# Patient Record
Sex: FEMALE
[De-identification: names, ages, dates, MRNs, and addresses within clinical notes are randomized per-mention and may not be internally consistent; named-entity substitution may affect disease eponyms.]

---

## 2017-09-19 ENCOUNTER — HOSPITAL ENCOUNTER (OUTPATIENT)
Dept: HOSPITAL 92 - LABBT | Age: 47
Discharge: HOME | End: 2017-09-19
Attending: SURGERY
Payer: COMMERCIAL

## 2017-09-19 DIAGNOSIS — D05.12: ICD-10-CM

## 2017-09-19 DIAGNOSIS — Z01.812: Primary | ICD-10-CM

## 2017-09-19 LAB
ANION GAP SERPL CALC-SCNC: 16 MMOL/L (ref 10–20)
BUN SERPL-MCNC: 13 MG/DL (ref 7–18.7)
CALCIUM SERPL-MCNC: 9.9 MG/DL (ref 7.8–10.44)
CHLORIDE SERPL-SCNC: 106 MMOL/L (ref 98–107)
CO2 SERPL-SCNC: 23 MMOL/L (ref 22–29)
CREAT CL PREDICTED SERPL C-G-VRATE: 0 ML/MIN (ref 70–130)
HCT VFR BLD CALC: 46 % (ref 36–47)
RBC # BLD AUTO: 4.86 MILL/UL (ref 4.2–5.4)
WBC # BLD AUTO: 6.6 THOU/UL (ref 4.8–10.8)

## 2017-09-19 PROCEDURE — 85025 COMPLETE CBC W/AUTO DIFF WBC: CPT

## 2017-09-19 PROCEDURE — 80048 BASIC METABOLIC PNL TOTAL CA: CPT

## 2017-09-28 ENCOUNTER — HOSPITAL ENCOUNTER (OUTPATIENT)
Dept: HOSPITAL 92 - SDC | Age: 47
Discharge: HOME | End: 2017-09-28
Attending: SURGERY
Payer: COMMERCIAL

## 2017-09-28 VITALS — BODY MASS INDEX: 32.9 KG/M2

## 2017-09-28 DIAGNOSIS — Z98.51: ICD-10-CM

## 2017-09-28 DIAGNOSIS — D05.12: Primary | ICD-10-CM

## 2017-09-28 PROCEDURE — 88333 PATH CONSLTJ SURG CYTO XM 1: CPT

## 2017-09-28 PROCEDURE — 76098 X-RAY EXAM SURGICAL SPECIMEN: CPT

## 2017-09-28 PROCEDURE — 88342 IMHCHEM/IMCYTCHM 1ST ANTB: CPT

## 2017-09-28 PROCEDURE — 0HBU0ZZ EXCISION OF LEFT BREAST, OPEN APPROACH: ICD-10-PCS | Performed by: SURGERY

## 2017-09-28 PROCEDURE — C71M1ZZ PLANAR NUCLEAR MEDICINE IMAGING OF TRUNK LYMPHATICS USING TECHNETIUM 99M (TC-99M): ICD-10-PCS | Performed by: RADIOLOGY

## 2017-09-28 PROCEDURE — A9541 TC99M SULFUR COLLOID: HCPCS

## 2017-09-28 PROCEDURE — 19281 PERQ DEVICE BREAST 1ST IMAG: CPT

## 2017-09-28 PROCEDURE — 78195 LYMPH SYSTEM IMAGING: CPT

## 2017-09-28 PROCEDURE — 88307 TISSUE EXAM BY PATHOLOGIST: CPT

## 2017-09-28 PROCEDURE — 88341 IMHCHEM/IMCYTCHM EA ADD ANTB: CPT

## 2017-09-28 PROCEDURE — BH01ZZZ PLAIN RADIOGRAPHY OF LEFT BREAST: ICD-10-PCS

## 2017-09-28 NOTE — MMO
SURGICAL SPECIMEN:

 

Date:  09/28/17 

 

HISTORY:  

Status post right breast biopsy. DCIS. Patient scheduled for surgical excision. 

 

FINDINGS/IMPRESSION:

A single view of the specimen demonstrates the surgical clip and calcifications. 

 

The results were conveyed by Muriel to the OR. 

 

 

 

POS: BUBBA

## 2017-09-28 NOTE — PDOC.OP
Operative Note





- Operative Note


Operative Note: 





PROCEDURE: Left breast needle localized lumpectomy and sentinel lymph node 

biopsy





DATE OF PROCEDURE: 9/28/2017





SURGEON: Concha Barrera M.D.





PREOPERATIVE DIAGNOSES: High-grade left breast DCIS with comedonecrosis





POSTOPERATIVE DIAGNOSIS: High-grade left breast DCIS with comedonecrosis





HISTORY: Patient is 47-year-old woman with recently diagnosed DCIS of the left 

breast. She has decided to undergo breast conservation therapy. [place] lymph 

node biopsy was recommended based on the pathological features of the DCIS, 

which was high-grade with necrosis noted, with increased risk of occult 

invasive disease.





PROCEDURE IN DETAIL: The patient underwent lymphoscintigraphy and needle 

localization of the left breast mass in radiology prior to being taken to the 

operating room. She was then taken to the operating room and placed in supine 

position and general anesthesia by LMA was administered. Appropriate 

preoperative antibiotics were administered and she has been fissure and 

injected behind the left areola and the breast was massaged for several minutes 

taking care not to displace or disturb the needle localization wire. She was 

then prepped and draped in the standard sterile fashion and local anesthesia 

infused the skin and subcutaneous tissues of the axilla. An incision was made 

over the lower edge of the hairbearing skin of the axilla and dissection 

carried down to the area of highest activity. Some blue lymphatics were seen 

feeding a small lymph node in this area which is morphologically normal. The 

lymphatics were clipped and the lymph node resected. A target count of 114 was 

obtained and the specimen was sent as sentinel lymph node #1. An additional 

area of increased activity was identified more laterally and a another small 

morphologically normal lymph node identified. This was not blue and had a 

target count of 77 and was sent as sentinel lymph node #2. Some additional 

axillary tissue was incidentally resected while dissecting out the second lymph 

node this did not appear to contain any lymph nodes and the target count was 

only 8 so it was not a sentinel lymph node. It was however sent as a specimen. 

The axillary wound was irrigated and examined for hemostasis which was 

excellent. No additional areas of increased activity were identified. 

Additional local anesthesia was infused circumferentially for postoperative 

pain management and the subcutaneous tissues were closed with 3-0 Monocryl 

suture and the skin closed with 4-0 subcuticular Monocryl suture.





Attention was then turned to the left breast needle localized lumpectomy. Local 

anesthesia was infused the skin and subcutaneous tissues at the needle 

localization site. An incision was made and flaps raised laterally through the 

subcutaneous tissues. The breast tissue surrounding the needle was then excised 

maintaining a distance of about 2-3 cm from the localization needle in all 

directions. Dissection was carried out to the end of the wire and the specimen 

removed and oriented for pathology with a long medial short superior and looped 

superficial suture. This was sent for a specimen mammogram which revealed that 

the clip and calcifications were in the specimen. The wound was irrigated and 

hemostasis obtained with Bovie electrocautery. The margins of the biopsy cavity 

were marked with large clips to help guide postoperative radiation therapy. 

Additional local anesthesia was infused for postoperative pain management and 

the subcutaneous tissues reapproximated with 3-0 Monocryl sutures. Additional 

local anesthesia was infused into the biopsy cavity and the skin was closed 

with 4-0 subcuticular Monocryl sutures. Estimated blood loss minimal. There 

were no complications.





SPECIMENS: Left axillary sentinel lymph nodes 2 with additional axillary tissue

, left breast needle localized lumpectomy.

## 2017-09-28 NOTE — NM
LEFT BREAST LYMPHOSCINTIGRAPHY:

 

Date:  09/28/17 

 

HISTORY:  

Intraductal carcinoma in situ of the left breast. 

 

FINDINGS:

Planar imaging was performed following the left periareolar injection of 400 microcuries technetium-
99m filtered sulfur colloid in divided doses. 

 

There are foci of increased uptake in the left axilla. No tracer localization is seen in the right a
xilla or the internal mammary chains on either side. 

 

IMPRESSION: 

[place] lymph node(s) in the left axilla. 

 

 

POS: BUBBA

## 2017-09-28 NOTE — MMO
LEFT BREAST NEEDLE LOCALIZATION:

 

Date:  09/28/17 

 

HISTORY:  

Left breast DCIS. Status post biopsy. 

 

FINDINGS:

Technically successful left breast needle localization for surgical excision. 5.0 cm [place] needle is
 placed. Needle and wire are adjacent to the biopsy clip. 

 

TECHNIQUE:  

Consent obtained to perform a left breast needle localization for surgical excision of a biopsy prov
en cancer. Left breast was compressed in a mediolateral projection. Skin was prepped and draped in t
he sterile fashion. 1% lidocaine, buffered with sodium bicarbonate, was used for local anesthesia. U
nder compression, a 5 cm [place] needle and wire were advanced into the breast. Images were obtained i
n the ML and CC projection. Needle position was appropriate with respect to the clip. Wire was deplo
yed. Post deployment images were performed. Needle and wire were secured to the patient. The patient
 tolerated the procedure well. No immediate postprocedure complication. 

 

IMPRESSION: 

Successful left breast needle localization. 

 

 

POS: BUBBA

## 2018-07-25 ENCOUNTER — HOSPITAL ENCOUNTER (OUTPATIENT)
Dept: HOSPITAL 92 - BICMAMMO | Age: 48
Discharge: HOME | End: 2018-07-25
Attending: SURGERY
Payer: COMMERCIAL

## 2018-07-25 DIAGNOSIS — Z85.3: ICD-10-CM

## 2018-07-25 DIAGNOSIS — R92.1: ICD-10-CM

## 2018-07-25 DIAGNOSIS — D05.12: Primary | ICD-10-CM

## 2018-07-25 DIAGNOSIS — Z98.890: ICD-10-CM

## 2018-07-25 PROCEDURE — 77066 DX MAMMO INCL CAD BI: CPT

## 2018-07-25 PROCEDURE — G0279 TOMOSYNTHESIS, MAMMO: HCPCS

## 2019-08-12 ENCOUNTER — HOSPITAL ENCOUNTER (OUTPATIENT)
Dept: HOSPITAL 92 - BICMAMMO | Age: 49
Discharge: HOME | End: 2019-08-12
Attending: PHYSICIAN ASSISTANT
Payer: COMMERCIAL

## 2019-08-12 DIAGNOSIS — D05.12: Primary | ICD-10-CM

## 2019-08-12 PROCEDURE — G0279 TOMOSYNTHESIS, MAMMO: HCPCS

## 2019-08-12 PROCEDURE — 77066 DX MAMMO INCL CAD BI: CPT

## 2019-08-12 NOTE — MMO
Bilateral MAMMO Bilat Diag [facility].

 

CLINICAL HISTORY:

Patient is 49 years old and is seen for diagnostic exam. The patient has no

family history of breast cancer.  The patient has a history of Stereotactic Core

Biopsy procedure revealed intraductal carcinoma, high grade in the left breast

in August, 2017.

 

VIEWS:

The views performed were:  bilateral craniocaudal with tomosynthesis; bilateral

mediolateral oblique with tomosynthesis; and bilateral mediolateral with

tomosynthesis.

 

FILMS COMPARED:

The present examination has been compared to prior imaging studies performed at

[facility] on 07/07/2016, 07/10/2017, 07/27/2017 and 07/25/2018.

 

MAMMOGRAM FINDINGS:

There are scattered fibroglandular densities.

 

Finding 1:  There is an area of architectural distortion with associated

post-surgical scar seen in the left breast.

 

Finding 2:

 

There are benign appearing calcifications.  There are no suspicious masses,

calcifications or areas of architectural distortion.

 

There are no suspicious masses, suspicious calcifications, or new areas of

architectural distortion.

 

IMPRESSION:

THERE IS NO MAMMOGRAPHIC EVIDENCE OF MALIGNANCY.

 

A ROUTINE FOLLOW-UP MAMMOGRAM IN 1 YEAR IS RECOMMENDED.

 

THE RESULTS OF THIS EXAM WERE SENT TO THE PATIENT.

 

ACR BI-RADS Category 2 - Benign finding

 

MAMMOGRAPHY NOTE:

 1. A negative mammogram report should not delay a biopsy if a dominant of

 clinically suspicious mass is present.

 2. Approximately 10% to 15% of breast cancers are not detected by

 mammography.

 3. Adenosis and dense breasts may obscure an underlying neoplasm.

 

 

Reported by: MIKE AVILA MD    Electonically Signed: [id number]

## 2020-11-13 ENCOUNTER — HOSPITAL ENCOUNTER (OUTPATIENT)
Dept: HOSPITAL 92 - BICULT | Age: 50
Discharge: HOME | End: 2020-11-13
Attending: SURGERY
Payer: COMMERCIAL

## 2020-11-13 DIAGNOSIS — Z85.3: ICD-10-CM

## 2020-11-13 DIAGNOSIS — N63.10: Primary | ICD-10-CM

## 2020-11-13 DIAGNOSIS — N64.89: ICD-10-CM

## 2021-05-14 ENCOUNTER — HOSPITAL ENCOUNTER (OUTPATIENT)
Dept: HOSPITAL 92 - BICULT | Age: 51
Discharge: HOME | End: 2021-05-14
Attending: SURGERY
Payer: COMMERCIAL

## 2021-05-14 DIAGNOSIS — Z87.898: ICD-10-CM

## 2021-05-14 DIAGNOSIS — N64.59: Primary | ICD-10-CM

## 2021-08-16 ENCOUNTER — HOSPITAL ENCOUNTER (OUTPATIENT)
Dept: HOSPITAL 92 - BICMAMMO | Age: 51
Discharge: HOME | End: 2021-08-16
Attending: SURGERY
Payer: COMMERCIAL

## 2021-08-16 DIAGNOSIS — Z08: Primary | ICD-10-CM

## 2021-08-16 DIAGNOSIS — Z85.3: ICD-10-CM

## 2021-08-16 PROCEDURE — G0279 TOMOSYNTHESIS, MAMMO: HCPCS

## 2021-08-16 PROCEDURE — 77066 DX MAMMO INCL CAD BI: CPT

## 2022-05-24 ENCOUNTER — HOSPITAL ENCOUNTER (OUTPATIENT)
Dept: HOSPITAL 92 - ULT | Age: 52
Discharge: HOME | End: 2022-05-24
Attending: FAMILY MEDICINE
Payer: COMMERCIAL

## 2022-05-24 DIAGNOSIS — K76.0: ICD-10-CM

## 2022-05-24 DIAGNOSIS — R10.84: Primary | ICD-10-CM

## 2022-05-24 PROCEDURE — 76700 US EXAM ABDOM COMPLETE: CPT

## 2022-08-18 ENCOUNTER — HOSPITAL ENCOUNTER (OUTPATIENT)
Dept: HOSPITAL 92 - BICMAMMO | Age: 52
Discharge: HOME | End: 2022-08-18
Attending: SURGERY
Payer: COMMERCIAL

## 2022-08-18 DIAGNOSIS — Z08: Primary | ICD-10-CM

## 2022-08-18 DIAGNOSIS — Z85.3: ICD-10-CM

## 2022-08-18 PROCEDURE — G0279 TOMOSYNTHESIS, MAMMO: HCPCS

## 2022-08-18 PROCEDURE — 77066 DX MAMMO INCL CAD BI: CPT

## 2023-03-24 NOTE — ULT
Exam:

Right breast ultrasound Limited:



HISTORY:

Patient presents with a palpable finding in the right breast.



COMPARISON:

Prior mammogram, 8/13/2020



Palpable area of concern is in the 7:00 position right breast 1 cm from the nipple. This area is eval
uated with ultrasound. There appears to be some asymmetric echogenic glandular tissue with some

tenting which may well account for the palpable finding. No evidence for definitive solid or cystic m
ass.



Patient is concern in regards to this mass and request six-month follow-up.



IMPRESSION:

BI-RADS Category 3 probably benign findings.

Follow-up ultrasound examination 6 months is recommended.



Asymmetric echogenic linear tissue in the 7:00 position 1 cm from the nipple with some associated ten
ting which may account for the palpable finding.



Reported By: Chava Dennison 

Electronically Signed:  11/13/2020 10:16 AM
Yes

## 2023-10-10 ENCOUNTER — HOSPITAL ENCOUNTER (OUTPATIENT)
Dept: HOSPITAL 92 - BICMAMMO | Age: 53
Discharge: HOME | End: 2023-10-10
Attending: SPECIALIST
Payer: COMMERCIAL

## 2023-10-10 DIAGNOSIS — Z12.31: Primary | ICD-10-CM

## 2023-10-10 DIAGNOSIS — Z98.890: ICD-10-CM

## 2023-10-10 DIAGNOSIS — Z85.3: ICD-10-CM

## 2023-10-10 PROCEDURE — 77063 BREAST TOMOSYNTHESIS BI: CPT

## 2023-10-10 PROCEDURE — 77067 SCR MAMMO BI INCL CAD: CPT

## 2024-10-29 ENCOUNTER — HOSPITAL ENCOUNTER (OUTPATIENT)
Dept: HOSPITAL 92 - BICMAMMO | Age: 54
Discharge: HOME | End: 2024-10-29
Attending: FAMILY MEDICINE
Payer: COMMERCIAL

## 2024-10-29 DIAGNOSIS — Z98.890: ICD-10-CM

## 2024-10-29 DIAGNOSIS — Z85.3: ICD-10-CM

## 2024-10-29 DIAGNOSIS — Z12.31: Primary | ICD-10-CM

## 2024-10-29 PROCEDURE — 77067 SCR MAMMO BI INCL CAD: CPT

## 2024-10-29 PROCEDURE — 77063 BREAST TOMOSYNTHESIS BI: CPT
